# Patient Record
Sex: FEMALE | Race: WHITE | NOT HISPANIC OR LATINO | Employment: FULL TIME | URBAN - METROPOLITAN AREA
[De-identification: names, ages, dates, MRNs, and addresses within clinical notes are randomized per-mention and may not be internally consistent; named-entity substitution may affect disease eponyms.]

---

## 2019-08-01 DIAGNOSIS — Z71.84 TRAVEL ADVICE ENCOUNTER: Primary | ICD-10-CM

## 2020-04-18 ENCOUNTER — HOSPITAL ENCOUNTER (OUTPATIENT)
Dept: LAB | Age: 30
Discharge: HOME | End: 2020-04-18
Attending: INTERNAL MEDICINE

## 2020-04-18 PROCEDURE — 87635 SARS-COV-2 COVID-19 AMP PRB: CPT

## 2020-04-19 LAB
SARS-COV-2 RNA SPEC QL NAA+PROBE: NOT DETECTED
SERVICE CMNT-IMP: NORMAL
SPECIMEN SOURCE: NORMAL

## 2020-04-23 ENCOUNTER — EMPLOYEE HEALTH (OUTPATIENT)
Dept: OTHER | Age: 30
End: 2020-04-23

## 2020-04-23 DIAGNOSIS — Z20.822 SUSPECTED COVID-19 VIRUS INFECTION: Primary | ICD-10-CM

## 2020-04-24 ENCOUNTER — LAB SERVICES (OUTPATIENT)
Dept: LAB | Age: 30
End: 2020-04-24

## 2020-04-24 ENCOUNTER — EMPLOYEE HEALTH (OUTPATIENT)
Dept: OTHER | Age: 30
End: 2020-04-24

## 2020-04-24 DIAGNOSIS — Z20.822 SUSPECTED COVID-19 VIRUS INFECTION: Primary | ICD-10-CM

## 2020-04-24 DIAGNOSIS — Z20.822 SUSPECTED COVID-19 VIRUS INFECTION: ICD-10-CM

## 2020-04-24 PROCEDURE — 87635 SARS-COV-2 COVID-19 AMP PRB: CPT | Performed by: HOSPITALIST

## 2020-04-25 ENCOUNTER — EMPLOYEE HEALTH (OUTPATIENT)
Dept: OTHER | Age: 30
End: 2020-04-25

## 2020-04-25 LAB
SARS-COV-2 RNA SPEC QL NAA+PROBE: NOT DETECTED
SERVICE CMNT-IMP: NORMAL
SPECIMEN SOURCE: NORMAL

## 2020-04-30 ENCOUNTER — EMPLOYEE HEALTH (OUTPATIENT)
Dept: OTHER | Age: 30
End: 2020-04-30

## 2020-07-30 ENCOUNTER — EMPLOYEE HEALTH (OUTPATIENT)
Dept: OTHER | Age: 30
End: 2020-07-30

## 2020-12-17 ENCOUNTER — APPOINTMENT (OUTPATIENT)
Dept: LAB | Age: 30
End: 2020-12-17

## 2020-12-21 ENCOUNTER — IMMUNIZATION (OUTPATIENT)
Dept: LAB | Age: 30
End: 2020-12-21

## 2020-12-21 DIAGNOSIS — Z23 NEED FOR VACCINATION: Primary | ICD-10-CM

## 2020-12-21 PROCEDURE — 91300 COVID 19 PFIZER-BIONTECH: CPT

## 2020-12-21 PROCEDURE — 0001A COVID 19 PFIZER-BIONTECH: CPT

## 2021-01-13 ENCOUNTER — IMMUNIZATION (OUTPATIENT)
Dept: LAB | Age: 31
End: 2021-01-13

## 2021-01-13 DIAGNOSIS — Z23 NEED FOR VACCINATION: Primary | ICD-10-CM

## 2021-01-13 PROCEDURE — 91300 COVID 19 PFIZER-BIONTECH: CPT | Performed by: NURSE PRACTITIONER

## 2021-01-13 PROCEDURE — 0002A COVID 19 PFIZER-BIONTECH: CPT | Performed by: NURSE PRACTITIONER

## 2021-03-17 ENCOUNTER — WALK IN (OUTPATIENT)
Dept: URGENT CARE | Age: 31
End: 2021-03-17
Attending: FAMILY MEDICINE

## 2021-03-17 DIAGNOSIS — Z20.822 EXPOSURE TO COVID-19 VIRUS: Primary | ICD-10-CM

## 2021-03-17 PROCEDURE — C9803 HOPD COVID-19 SPEC COLLECT: HCPCS

## 2021-03-17 PROCEDURE — U0003 INFECTIOUS AGENT DETECTION BY NUCLEIC ACID (DNA OR RNA); SEVERE ACUTE RESPIRATORY SYNDROME CORONAVIRUS 2 (SARS-COV-2) (CORONAVIRUS DISEASE [COVID-19]), AMPLIFIED PROBE TECHNIQUE, MAKING USE OF HIGH THROUGHPUT TECHNOLOGIES AS DESCRIBED BY CMS-2020-01-R: HCPCS | Performed by: FAMILY MEDICINE

## 2021-03-17 PROCEDURE — 99212 OFFICE O/P EST SF 10 MIN: CPT

## 2021-03-17 ASSESSMENT — ENCOUNTER SYMPTOMS
ABDOMINAL PAIN: 0
HEADACHES: 0
SORE THROAT: 0
CHILLS: 0
NAUSEA: 0
SHORTNESS OF BREATH: 0
DIARRHEA: 0
COUGH: 0
DIZZINESS: 0
FEVER: 0
VOMITING: 0
BLURRED VISION: 0

## 2021-03-17 ASSESSMENT — PAIN SCALES - GENERAL: PAINLEVEL: 0

## 2021-03-18 LAB
SARS-COV-2 RNA RESP QL NAA+PROBE: NOT DETECTED
SERVICE CMNT-IMP: NORMAL
SERVICE CMNT-IMP: NORMAL

## 2021-05-26 VITALS
DIASTOLIC BLOOD PRESSURE: 88 MMHG | HEART RATE: 86 BPM | TEMPERATURE: 97.5 F | OXYGEN SATURATION: 98 % | RESPIRATION RATE: 16 BRPM | SYSTOLIC BLOOD PRESSURE: 116 MMHG

## 2023-07-27 ENCOUNTER — NURSE ONLY (OUTPATIENT)
Dept: INTERNAL MEDICINE CLINIC | Facility: HOSPITAL | Age: 33
End: 2023-07-27
Attending: EMERGENCY MEDICINE

## 2023-07-27 DIAGNOSIS — Z00.00 WELLNESS EXAMINATION: Primary | ICD-10-CM

## 2023-07-27 PROCEDURE — 86480 TB TEST CELL IMMUN MEASURE: CPT

## 2023-07-28 LAB
M TB IFN-G CD4+ T-CELLS BLD-ACNC: 0.93 IU/ML
M TB TUBERC IFN-G BLD QL: NEGATIVE
M TB TUBERC IGNF/MITOGEN IGNF CONTROL: >10 IU/ML
QFT TB1 AG MINUS NIL: 0.19 IU/ML
QFT TB2 AG MINUS NIL: 0.21 IU/ML

## 2024-04-10 ENCOUNTER — LAB SERVICES (OUTPATIENT)
Dept: LAB | Age: 34
End: 2024-04-10

## 2024-04-10 DIAGNOSIS — Z00.00 ROUTINE ADULT HEALTH MAINTENANCE: ICD-10-CM

## 2024-04-10 LAB
25(OH)D3+25(OH)D2 SERPL-MCNC: 24.8 NG/ML (ref 30–100)
ALBUMIN SERPL-MCNC: 4.5 G/DL (ref 3.6–5.1)
ALBUMIN/GLOB SERPL: 1.4 {RATIO} (ref 1–2.4)
ALP SERPL-CCNC: 41 UNITS/L (ref 45–117)
ALT SERPL-CCNC: 19 UNITS/L
ANION GAP SERPL CALC-SCNC: 14 MMOL/L (ref 7–19)
AST SERPL-CCNC: 20 UNITS/L
BASOPHILS # BLD: 0 K/MCL (ref 0–0.3)
BASOPHILS NFR BLD: 0 %
BILIRUB SERPL-MCNC: 1.1 MG/DL (ref 0.2–1)
BUN SERPL-MCNC: 14 MG/DL (ref 6–20)
BUN/CREAT SERPL: 17 (ref 7–25)
CALCIUM SERPL-MCNC: 9.9 MG/DL (ref 8.4–10.2)
CHLORIDE SERPL-SCNC: 101 MMOL/L (ref 97–110)
CHOLEST SERPL-MCNC: 215 MG/DL
CHOLEST/HDLC SERPL: 2.8 {RATIO}
CO2 SERPL-SCNC: 28 MMOL/L (ref 21–32)
CREAT SERPL-MCNC: 0.81 MG/DL (ref 0.51–0.95)
DEPRECATED RDW RBC: 38.6 FL (ref 39–50)
EGFRCR SERPLBLD CKD-EPI 2021: >90 ML/MIN/{1.73_M2}
EOSINOPHIL # BLD: 0.1 K/MCL (ref 0–0.5)
EOSINOPHIL NFR BLD: 3 %
ERYTHROCYTE [DISTWIDTH] IN BLOOD: 12.1 % (ref 11–15)
FASTING DURATION TIME PATIENT: 12 HOURS (ref 0–999)
GLOBULIN SER-MCNC: 3.3 G/DL (ref 2–4)
GLUCOSE SERPL-MCNC: 83 MG/DL (ref 70–99)
HCT VFR BLD CALC: 44.4 % (ref 36–46.5)
HDLC SERPL-MCNC: 76 MG/DL
HGB BLD-MCNC: 15.1 G/DL (ref 12–15.5)
IMM GRANULOCYTES # BLD AUTO: 0 K/MCL (ref 0–0.2)
IMM GRANULOCYTES # BLD: 0 %
LDLC SERPL CALC-MCNC: 127 MG/DL
LYMPHOCYTES # BLD: 2.3 K/MCL (ref 1–4.8)
LYMPHOCYTES NFR BLD: 46 %
MCH RBC QN AUTO: 30 PG (ref 26–34)
MCHC RBC AUTO-ENTMCNC: 34 G/DL (ref 32–36.5)
MCV RBC AUTO: 88.1 FL (ref 78–100)
MONOCYTES # BLD: 0.3 K/MCL (ref 0.3–0.9)
MONOCYTES NFR BLD: 6 %
NEUTROPHILS # BLD: 2.3 K/MCL (ref 1.8–7.7)
NEUTROPHILS NFR BLD: 45 %
NONHDLC SERPL-MCNC: 139 MG/DL
NRBC BLD MANUAL-RTO: 0 /100 WBC
PLATELET # BLD AUTO: 193 K/MCL (ref 140–450)
POTASSIUM SERPL-SCNC: 4.4 MMOL/L (ref 3.4–5.1)
PROT SERPL-MCNC: 7.8 G/DL (ref 6.4–8.2)
RBC # BLD: 5.04 MIL/MCL (ref 4–5.2)
SODIUM SERPL-SCNC: 139 MMOL/L (ref 135–145)
TRIGL SERPL-MCNC: 59 MG/DL
TSH SERPL-ACNC: 1.06 MCUNITS/ML (ref 0.35–5)
WBC # BLD: 5.1 K/MCL (ref 4.2–11)

## 2024-04-10 PROCEDURE — 36415 COLL VENOUS BLD VENIPUNCTURE: CPT | Performed by: CLINICAL MEDICAL LABORATORY

## 2024-04-10 PROCEDURE — 80050 GENERAL HEALTH PANEL: CPT | Performed by: CLINICAL MEDICAL LABORATORY

## 2024-04-10 PROCEDURE — 80061 LIPID PANEL: CPT | Performed by: CLINICAL MEDICAL LABORATORY

## 2024-04-10 PROCEDURE — 82306 VITAMIN D 25 HYDROXY: CPT | Performed by: CLINICAL MEDICAL LABORATORY

## 2024-09-23 ENCOUNTER — TELEPHONE (OUTPATIENT)
Dept: INTERNAL MEDICINE CLINIC | Facility: HOSPITAL | Age: 34
End: 2024-09-23

## 2024-09-23 ENCOUNTER — LAB ENCOUNTER (OUTPATIENT)
Dept: LAB | Facility: HOSPITAL | Age: 34
End: 2024-09-23
Attending: PREVENTIVE MEDICINE

## 2024-09-23 DIAGNOSIS — Z20.822 SUSPECTED COVID-19 VIRUS INFECTION: Primary | ICD-10-CM

## 2024-09-23 DIAGNOSIS — Z20.822 SUSPECTED COVID-19 VIRUS INFECTION: ICD-10-CM

## 2024-09-23 LAB — SARS-COV-2 RNA RESP QL NAA+PROBE: NOT DETECTED

## 2024-09-23 NOTE — TELEPHONE ENCOUNTER
[x] EH  []CRISTHIAN   [] [facility]  Manager : Nancy Boecker    [x] Direct Patient Care  []Indirect Patient Contact   [] Non-Clinical/No Patient Contact    For Direct Patient Care ONLY: Have you been fitted with an N95 mask within the last 12 months? [x] Yes  []No      High Risk Area:    [] Yes   [x] No    HAVE YOU RECEIVED THE COVID-19 Vaccine? Yes [x]    No []          If yes, date(s) received:   12/21/2020 01/13/2021 11/17/2022  Which vaccine:  Pfizer [x]     Moderna [x]    J&J []      SYMPTOMS (reported via dashboard):  [] asymptomatic  [x] symptomatic  [] GI symptoms only    Symptom onset date: 09/23/2024  Fever   > 100F             Yes []      Cough                          Yes []      Shortness of breath  Yes []      Congestion                 Yes []      Runny nose                Yes []        Loss of Smell              Yes []        Loss of Taste             Yes []       Sore throat                 Yes []       Fatigue                        Yes [x]       Body Aches                Yes []        Chills                           Yes []        Headache                   Yes [x]             GI symptoms             Yes []     No [x]                     Nausea   []          Vomiting            []                                    Diarrhea  []          Upset stomach []      Employee reported COVID Exposure?  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 [] Yes [x]  No    Is this a true exposure? []  Yes [x]  No    When was the last shift you worked?: 09/23/2024    Employee has a history of Covid?  Yes [x]     No []   If Yes, when: 04/2022    Employee is immunocompromised?  Yes []     No [x]      PLAN:     COVID-19 testing ordered:   [x] Rapid      [] Alinity              Date test is to be taken:   09/23/2024    []  No testing  required at this time  []  Outside testing                           Notes:    INSTRUCTIONS PROVIDED:    [x]  Employee was instructed to call Central scheduling at [phone] or use [employer] (remove any insurance information listed) to make an appointment for their testing   [x]  May return to work if employee views negative result in MyChart and remains fever, vomiting, and diarrhea free  []  May continue to work if remains asymptomatic and views negative result in MyChart  []  Follow up for condition update when resulting  []  If symptoms develop, stay home and call hotline for rapid test order  []  If COVID positive results, off work minimum of 5 days from positive test or onset of symptoms (day 0)     [x]  Plan noted above  [x]  Length of time to obtain results  []  Quarantine instructions  [x]  S/S of worsening infection/condition and importance of prompt medical re-evaluation including when to seek emergency care.   [x] The employee voiced understanding

## 2024-09-24 NOTE — TELEPHONE ENCOUNTER
Results and RTW guidelines:    COVID RESULT:    [x] Viewed by employee in [employer].  RTW plan and instructions as indicated on triage call.  Manager notified.  Estimated RTW date:   [] Discussed with employee   [x] Unable to reach by phone.  Sent via [employer] message      Test type:    [x] Rapid         [] Alinity         [] Outside test:       [x] NEGATIVE     Ordered Alinity retest?  []Yes   [x] No (skip to RTW)   Ordered Rapid retest?   []Yes   [x] No (skip to RTW)           Dated to be taken:      If Yes, PLACE ORDER NOW and instruct the following:  -Originally Symptomatic or Now Symptoms:   -RTW when sx improve- fever free for 24 hours w/o medications, Diarrhea/Vomiting for 24 hours w/o medications    -Originally  Asymptomatic  -Asymptomatic AND Vaccinated or Unvaccinated or Prior infection in past 90 days:   -May work and continue to monitor symptoms for the next 10 days.                                         -Alinity test day 2, Alinity test day 5 (day 0 - exposure)      Notes:     RTW PLAN:    []  If COVID positive results, off work minimum of 5 days from positive test or onset of symptoms (day 0)        On day 5, if asymptomatic or mildly symptomatic (with improving symptoms) may return to work day 6          On day 5, if symptomatic, call Employee Health for RTW screening        []  COVID positive result - call Employee Health on day 5 after symptom onset.  The employee needs to be cleared by Employee Health to RTW.  [x] RTW immediately, continue to monitor for sx  [] RTW when sx improve; must be fever free for 24 hours w/o medications, Diarrhea/Vomiting free for 24 hours w/o medications  [] Alinity ordered; continue to monitor sx and call for new/worsening sx.  Discuss RTW guidelines with manager  [] May continue to work  [] Follow up with PCP  [] Home until further instruction from hotline with Alinity results  INSTRUCTIONS PROVIDED:  [x]  Plan as noted above  []  Length of time to obtain results   []   Quarantine instructions  []  Masking protocol   []  S/S of worsening infection/condition and importance of prompt medical re-evaluation including when to seek emergency care  [] If symptoms develop, stay home and call hotline for rapid test order    Estimated RTW date:      [] The employee voiced understanding of above plan/instructions  [x] Manager Notified

## 2025-03-12 ENCOUNTER — OFFICE VISIT (OUTPATIENT)
Dept: OBGYN CLINIC | Facility: CLINIC | Age: 35
End: 2025-03-12

## 2025-03-12 VITALS
SYSTOLIC BLOOD PRESSURE: 120 MMHG | WEIGHT: 90 LBS | BODY MASS INDEX: 14.12 KG/M2 | HEIGHT: 67 IN | DIASTOLIC BLOOD PRESSURE: 90 MMHG

## 2025-03-12 DIAGNOSIS — N92.6 MENSTRUAL CHANGES: ICD-10-CM

## 2025-03-12 DIAGNOSIS — N97.9 FEMALE INFERTILITY: ICD-10-CM

## 2025-03-12 DIAGNOSIS — Z31.69 ENCOUNTER FOR PRECONCEPTION CONSULTATION: Primary | ICD-10-CM

## 2025-03-12 PROCEDURE — 99203 OFFICE O/P NEW LOW 30 MIN: CPT | Performed by: STUDENT IN AN ORGANIZED HEALTH CARE EDUCATION/TRAINING PROGRAM

## 2025-03-12 NOTE — PROGRESS NOTES
[facility]  Obstetrics and Gynecology  Gynecology New Patient  Exam    Chief Complaint   Patient presents with    Gyn Exam     Family planning ,            Raysa Manzano is a 35 year old female presenting as a new patient for preconception counseling and infertility; started trying to conceive in Jan 2024 with timed intercourse on an corona, and then started with OPK + timed intercourse for the last 9 months without pregnancy.    Taking PNV and Vit D. Trying without OPK since Jan 2024.     Skipped 1 period 3 mos ago due to studying for [employer] (endocrine, works at [employer]).  works for [employer].     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   G0     has no conditions, semen analysis pending with urologist. Beta thal minor. Has never fathered a child.     Timed intercourse for the last 9 months with OPKs (sometimes skips if stressed). Ovulating day 16-17.   Last ovulated 3/2. [place] daily to every other day.       Menstrual/Gyn Hx:   Menarche 18  Flow is/was regular, every 32-33 days and lasting 5-6 days. Moderate flow overall.  Has  dysmenorrhea (first 2 days - responds to ibuprofen). Does not precede bleeding or associated with   Reports no h/o abnormal Pap  No h/o STI  Reports no h/o fibroids or ovarian cysts. Has never had any type of GYN surgery.   Has never used birth control in the past;.   Is  currently sexually active, 1 lifetime partners, and reports no problems with intercourse.   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         Medications (Active prior to today's visit):  No current outpatient medications on file.      Allergies:  Allergies[1]  HISTORY:     OB History    Para Term  AB Living   0 0 0 0 0 0   SAB IAB Ectopic Multiple Live Births   0 0 0 0 0       Past Medical History:    Decorative tattoo       Past Surgical History:   Procedure Laterality Date    Other surgical history  TE repair       Family History   Problem Relation Age of Onset    High Cholesterol Father     High Cholesterol Mother     Hypertension Mother     Diabetes Maternal Grandmother     Breast Cancer Maternal Grandmother     Cancer Paternal Grandmother         Bladder Cancer; diagnosed around age 80    Heart Disease Paternal Grandfather        Social History     Socioeconomic History    Marital status:      Spouse name: Not on file    Number of children: Not on file    Years of education: Not on file    Highest education level: Not on file   Occupational History    Not on file   Tobacco Use    Smoking status: Never     Passive exposure: Never    Smokeless tobacco: Never   Vaping Use    Vaping status: Never Used   Substance and Sexual Activity    Alcohol use: Yes    Drug use: Never    Sexual activity: Yes     Partners: Male   Other Topics Concern    Not on file   Social History Narrative    Not on file     Social Drivers of Health     Food Insecurity: Not on file   Transportation Needs: Unknown (2024)    Received from [facility]    Transportation Needs     Currently or in the past 3 months, has lack of transportation kept you from medical appointments, getting food or medicine, or providing care to a family member?: Unrecognized value     : Not on file     Medical Transportation Needs?: Did Not Ask     Daily Living Transportation Needs? [Peds Only] : Not on file   Stress: Not on file   Housing Stability: Low Risk  (2022)    Received from [facility], [facility]    Housing Stability     Mortgage Payment Concerns?: Not on file     Number of Places Lived in the Last  Year: Not on file     Unstable Housing?: Not on file       ROS:   Review of Systems:    General: no fevers, chills, unintended weight loss/gain except as above  Cardiovascular: no chest pain, new or unexplained SOB except as above  Gastrointestinal: no nausea/vomiting, diarrhea, or blood in stool except as above  Respiratory: no new symptoms reported except as above  Skin: no new symptoms reported except as above  Psychiatric: no new symptoms reported except as above  PHYSICAL EXAM:   /90   Ht 5' 7\" (1.702 m)   Wt 90 lb (40.8 kg)   LMP 02/14/2025 (Exact Date)   BMI 14.10 kg/m²     Physical Exam  HENT:      Head: Normocephalic and atraumatic.   Eyes:      Extraocular Movements: Extraocular movements intact.   Pulmonary:      Effort: Pulmonary effort is normal.   Abdominal:      Palpations: Abdomen is soft.   Genitourinary:     Comments: Deferred  Musculoskeletal:      Cervical back: Normal range of motion.   Skin:     General: Skin is warm and dry.   Neurological:      General: No focal deficit present.      Mental Status: She is alert. Mental status is at baseline.   Psychiatric:         Behavior: Behavior normal.         Thought Content: Thought content normal.             RESULTS & IMAGING         No results for input(s): \"URINEPREG\" in the last 72 hours.    No results for input(s): \"PGLU\", \"POCTGLUCOSE\" in the last 72 hours.    No results for input(s): \"GLUCOSEDIP\", \"BILIRUBIN\", \"KETONESDIP\", \"BLOODU\", \"PHURINE\", \"UROBILIN\", \"NITRITE\", \"LEUKOCYTES\", \"APPEARANCE\", \"URINECOLOR\" in the last 72 hours.    Invalid input(s): \"SPECGRAV\"       ASSESSMENT & PLAN     Encounter for preconception consultation (Primary)  -     Type and screen  -     Protein/Creatinine Ratio, Urine Random; Future; Expected date: 03/17/2025  -     Cystic Fibrosis (CF) Full-gene Carrier Screen; Future; Expected date: 03/17/2025  -     Spinal Muscular Atrophy (SMA); Future; Expected date: 03/17/2025  -     Hemoglobin Electrophoresis  w/Rflx Alpha,beta Chain; Future; Expected date: 03/17/2025  -     FSH; Future; Expected date: 03/17/2025  -     LH (Luteinizing Hormone); Future; Expected date: 03/17/2025  -     Testosterone,Total and Weakly Bound w/ SHBG; Future; Expected date: 03/17/2025  -     Androstenedione; Future; Expected date: 03/17/2025  -     US PELVIS (TRANSABDOMINAL PELVIS)  (CPT=76856); Future; Expected date: 03/17/2025  -     Anti-Müllerian Hormone (AMH) ([facility])  -     Assay, Thyroid Stim Hormone  -     CBC, Platelet; No Differential  -     Comp Metabolic Panel (14)  -     Dehydroepiandrosterone Sulfate  -     Free T4, (Free Thyroxine)  -     Hemoglobin A1C  -     Hepatitis B Surface Antigen  -     HIV Ag/Ab Combo  -     Rubella Antibodies, IgG  -     T Pallidum Screening Cascade  -     Thyroid Peroxidase (TPO) AB  -     Varicella Zoster, IGG  Female infertility  -     FSH; Future; Expected date: 03/17/2025  -     LH (Luteinizing Hormone); Future; Expected date: 03/17/2025  -     Testosterone,Total and Weakly Bound w/ SHBG; Future; Expected date: 03/17/2025  -     Androstenedione; Future; Expected date: 03/17/2025  -     XR HYSTEROSALPINGOGRAM (CPT=74740/17236); Future; Expected date: 03/17/2025  -     US PELVIS (TRANSABDOMINAL PELVIS)  (CPT=76856); Future; Expected date: 03/17/2025  -     Anti-Müllerian Hormone (AMH) ([facility])  -     Assay, Thyroid Stim Hormone  -     Dehydroepiandrosterone Sulfate  -     Free T4, (Free Thyroxine)  -     Thyroid Peroxidase (TPO) AB  Menstrual changes  -     Testosterone,Total and Weakly Bound w/ SHBG; Future; Expected date: 03/17/2025  -     Androstenedione; Future; Expected date: 03/17/2025  -     US PELVIS (TRANSABDOMINAL PELVIS)  (CPT=76856); Future; Expected date: 03/17/2025  -     Assay, Thyroid Stim Hormone  -     Dehydroepiandrosterone Sulfate  -     Free T4, (Free Thyroxine)  -     Thyroid Peroxidase (TPO) AB      Discussed infertility evaluation + optimizing timed  intercourse  Preconception labs ordered as well   F/u after labs resulted (will get on day 3 of next cycle)    Louisa Nunn MD  3/12/2025  10:36 AM             [1] No Known Allergies

## 2025-03-21 ENCOUNTER — TELEPHONE (OUTPATIENT)
Dept: OBGYN CLINIC | Facility: CLINIC | Age: 35
End: 2025-03-21

## 2025-03-22 ENCOUNTER — LAB ENCOUNTER (OUTPATIENT)
Dept: LAB | Facility: HOSPITAL | Age: 35
End: 2025-03-22
Attending: STUDENT IN AN ORGANIZED HEALTH CARE EDUCATION/TRAINING PROGRAM
Payer: COMMERCIAL

## 2025-03-22 DIAGNOSIS — N92.6 MENSTRUAL CHANGES: ICD-10-CM

## 2025-03-22 DIAGNOSIS — Z31.69 ENCOUNTER FOR PRECONCEPTION CONSULTATION: ICD-10-CM

## 2025-03-22 DIAGNOSIS — N97.9 FEMALE INFERTILITY: ICD-10-CM

## 2025-03-22 LAB
ALBUMIN SERPL-MCNC: 5.1 G/DL (ref 3.2–4.8)
ALBUMIN/GLOB SERPL: 1.8 {RATIO} (ref 1–2)
ALP LIVER SERPL-CCNC: 45 U/L
ALT SERPL-CCNC: 13 U/L
ANION GAP SERPL CALC-SCNC: 9 MMOL/L (ref 0–18)
ANTIBODY SCREEN: NEGATIVE
AST SERPL-CCNC: 20 U/L (ref ?–34)
BILIRUB SERPL-MCNC: 0.9 MG/DL (ref 0.3–1.2)
BUN BLD-MCNC: 7 MG/DL (ref 9–23)
CALCIUM BLD-MCNC: 10.4 MG/DL (ref 8.7–10.6)
CHLORIDE SERPL-SCNC: 104 MMOL/L (ref 98–112)
CO2 SERPL-SCNC: 28 MMOL/L (ref 21–32)
CREAT BLD-MCNC: 0.92 MG/DL
DHEA-S SERPL-MCNC: 226 UG/DL
EGFRCR SERPLBLD CKD-EPI 2021: 84 ML/MIN/1.73M2 (ref 60–?)
ERYTHROCYTE [DISTWIDTH] IN BLOOD BY AUTOMATED COUNT: 11.8 %
EST. AVERAGE GLUCOSE BLD GHB EST-MCNC: 108 MG/DL (ref 68–126)
FASTING STATUS PATIENT QL REPORTED: YES
FSH SERPL-ACNC: 7 MIU/ML
GLOBULIN PLAS-MCNC: 2.8 G/DL (ref 2–3.5)
GLUCOSE BLD-MCNC: 88 MG/DL (ref 70–99)
HBA1C MFR BLD: 5.4 % (ref ?–5.7)
HBV SURFACE AG SER-ACNC: 0.29 [IU]/L
HBV SURFACE AG SERPL QL IA: NONREACTIVE
HCT VFR BLD AUTO: 39.4 %
HGB BLD-MCNC: 13.6 G/DL
LH SERPL-ACNC: 9.7 MIU/ML
MCH RBC QN AUTO: 30.2 PG (ref 26–34)
MCHC RBC AUTO-ENTMCNC: 34.5 G/DL (ref 31–37)
MCV RBC AUTO: 87.4 FL
OSMOLALITY SERPL CALC.SUM OF ELEC: 289 MOSM/KG (ref 275–295)
PLATELET # BLD AUTO: 210 10(3)UL (ref 150–450)
POTASSIUM SERPL-SCNC: 3.9 MMOL/L (ref 3.5–5.1)
PROT SERPL-MCNC: 7.9 G/DL (ref 5.7–8.2)
RBC # BLD AUTO: 4.51 X10(6)UL
RH BLOOD TYPE: POSITIVE
RUBV IGG SER QL: POSITIVE
RUBV IGG SER-ACNC: 16 IU/ML (ref 10–?)
SODIUM SERPL-SCNC: 141 MMOL/L (ref 136–145)
T PALLIDUM AB SER QL IA: NONREACTIVE
T4 FREE SERPL-MCNC: 1.4 NG/DL (ref 0.8–1.7)
THYROPEROXIDASE AB SERPL-ACNC: <28 U/ML (ref ?–60)
TSI SER-ACNC: 1.44 UIU/ML (ref 0.55–4.78)
WBC # BLD AUTO: 4.4 X10(3) UL (ref 4–11)

## 2025-03-22 PROCEDURE — 86901 BLOOD TYPING SEROLOGIC RH(D): CPT | Performed by: STUDENT IN AN ORGANIZED HEALTH CARE EDUCATION/TRAINING PROGRAM

## 2025-03-22 PROCEDURE — 86900 BLOOD TYPING SEROLOGIC ABO: CPT | Performed by: STUDENT IN AN ORGANIZED HEALTH CARE EDUCATION/TRAINING PROGRAM

## 2025-03-22 PROCEDURE — 83036 HEMOGLOBIN GLYCOSYLATED A1C: CPT | Performed by: STUDENT IN AN ORGANIZED HEALTH CARE EDUCATION/TRAINING PROGRAM

## 2025-03-22 PROCEDURE — 84439 ASSAY OF FREE THYROXINE: CPT | Performed by: STUDENT IN AN ORGANIZED HEALTH CARE EDUCATION/TRAINING PROGRAM

## 2025-03-22 PROCEDURE — 83002 ASSAY OF GONADOTROPIN (LH): CPT

## 2025-03-22 PROCEDURE — 86787 VARICELLA-ZOSTER ANTIBODY: CPT | Performed by: STUDENT IN AN ORGANIZED HEALTH CARE EDUCATION/TRAINING PROGRAM

## 2025-03-22 PROCEDURE — 83020 HEMOGLOBIN ELECTROPHORESIS: CPT

## 2025-03-22 PROCEDURE — 86850 RBC ANTIBODY SCREEN: CPT | Performed by: STUDENT IN AN ORGANIZED HEALTH CARE EDUCATION/TRAINING PROGRAM

## 2025-03-22 PROCEDURE — 86780 TREPONEMA PALLIDUM: CPT | Performed by: STUDENT IN AN ORGANIZED HEALTH CARE EDUCATION/TRAINING PROGRAM

## 2025-03-22 PROCEDURE — 87389 HIV-1 AG W/HIV-1&-2 AB AG IA: CPT | Performed by: STUDENT IN AN ORGANIZED HEALTH CARE EDUCATION/TRAINING PROGRAM

## 2025-03-22 PROCEDURE — 83520 IMMUNOASSAY QUANT NOS NONAB: CPT | Performed by: STUDENT IN AN ORGANIZED HEALTH CARE EDUCATION/TRAINING PROGRAM

## 2025-03-22 PROCEDURE — 80053 COMPREHEN METABOLIC PANEL: CPT | Performed by: STUDENT IN AN ORGANIZED HEALTH CARE EDUCATION/TRAINING PROGRAM

## 2025-03-22 PROCEDURE — 36415 COLL VENOUS BLD VENIPUNCTURE: CPT

## 2025-03-22 PROCEDURE — 83021 HEMOGLOBIN CHROMOTOGRAPHY: CPT

## 2025-03-22 PROCEDURE — 84443 ASSAY THYROID STIM HORMONE: CPT | Performed by: STUDENT IN AN ORGANIZED HEALTH CARE EDUCATION/TRAINING PROGRAM

## 2025-03-22 PROCEDURE — 86762 RUBELLA ANTIBODY: CPT | Performed by: STUDENT IN AN ORGANIZED HEALTH CARE EDUCATION/TRAINING PROGRAM

## 2025-03-22 PROCEDURE — 83001 ASSAY OF GONADOTROPIN (FSH): CPT

## 2025-03-22 PROCEDURE — 87340 HEPATITIS B SURFACE AG IA: CPT | Performed by: STUDENT IN AN ORGANIZED HEALTH CARE EDUCATION/TRAINING PROGRAM

## 2025-03-22 PROCEDURE — 81329 SMN1 GENE DOS/DELETION ALYS: CPT

## 2025-03-22 PROCEDURE — 84410 TESTOSTERONE BIOAVAILABLE: CPT

## 2025-03-22 PROCEDURE — 85027 COMPLETE CBC AUTOMATED: CPT | Performed by: STUDENT IN AN ORGANIZED HEALTH CARE EDUCATION/TRAINING PROGRAM

## 2025-03-22 PROCEDURE — 86376 MICROSOMAL ANTIBODY EACH: CPT | Performed by: STUDENT IN AN ORGANIZED HEALTH CARE EDUCATION/TRAINING PROGRAM

## 2025-03-22 PROCEDURE — 82627 DEHYDROEPIANDROSTERONE: CPT | Performed by: STUDENT IN AN ORGANIZED HEALTH CARE EDUCATION/TRAINING PROGRAM

## 2025-03-22 PROCEDURE — 82157 ASSAY OF ANDROSTENEDIONE: CPT

## 2025-03-24 LAB — VZV IGG SER IA-ACNC: 5.75 (ref 1–?)

## 2025-03-26 LAB
HGB A2 MFR BLD: 2.5 % (ref 1.5–3.5)
HGB PNL BLD ELPH: 97.5 % (ref 95.5–100)
MULLERIAN AMH: 2.91 NG/ML

## 2025-03-28 LAB
ANDROSTENEDIONE: 155 NG/DL
SEX HORM BIND GLOB: 125 NMOL/L
TESTOST % FREE+WEAK BND: 6.3 %
TESTOST FREE+WEAK BND: 1.9 NG/DL
TESTOSTERONE TOT /MS: 30.6 NG/DL

## 2025-04-23 ENCOUNTER — HOSPITAL ENCOUNTER (OUTPATIENT)
Dept: ULTRASOUND IMAGING | Age: 35
Discharge: HOME | End: 2025-04-23
Attending: STUDENT IN AN ORGANIZED HEALTH CARE EDUCATION/TRAINING PROGRAM
Payer: COMMERCIAL

## 2025-04-23 DIAGNOSIS — N97.9 FEMALE INFERTILITY: ICD-10-CM

## 2025-04-23 DIAGNOSIS — N92.6 MENSTRUAL CHANGES: ICD-10-CM

## 2025-04-23 DIAGNOSIS — Z31.69 ENCOUNTER FOR PRECONCEPTION CONSULTATION: ICD-10-CM

## 2025-04-23 PROCEDURE — 76856 US EXAM PELVIC COMPLETE: CPT | Performed by: STUDENT IN AN ORGANIZED HEALTH CARE EDUCATION/TRAINING PROGRAM

## 2025-04-23 PROCEDURE — 76830 TRANSVAGINAL US NON-OB: CPT | Performed by: STUDENT IN AN ORGANIZED HEALTH CARE EDUCATION/TRAINING PROGRAM

## 2025-06-10 ENCOUNTER — LAB ENCOUNTER (OUTPATIENT)
Dept: LAB | Facility: HOSPITAL | Age: 35
End: 2025-06-10
Attending: PREVENTIVE MEDICINE
Payer: COMMERCIAL

## 2025-06-10 DIAGNOSIS — Z20.822 SUSPECTED COVID-19 VIRUS INFECTION: ICD-10-CM

## 2025-06-10 LAB — SARS-COV-2 RNA RESP QL NAA+PROBE: NOT DETECTED

## 2025-07-20 ENCOUNTER — LAB ENCOUNTER (OUTPATIENT)
Dept: LAB | Facility: HOSPITAL | Age: 35
End: 2025-07-20
Attending: STUDENT IN AN ORGANIZED HEALTH CARE EDUCATION/TRAINING PROGRAM
Payer: COMMERCIAL

## 2025-07-20 DIAGNOSIS — O20.9 BLEEDING IN EARLY PREGNANCY (HCC): ICD-10-CM

## 2025-07-20 LAB
B-HCG SERPL-ACNC: 55.2 MIU/ML (ref ?–4.2)
PROGEST SERPL-MCNC: 0.88 NG/ML

## 2025-07-20 PROCEDURE — 36415 COLL VENOUS BLD VENIPUNCTURE: CPT

## 2025-07-20 PROCEDURE — 84144 ASSAY OF PROGESTERONE: CPT

## 2025-07-20 PROCEDURE — 84702 CHORIONIC GONADOTROPIN TEST: CPT

## 2025-07-22 ENCOUNTER — LAB ENCOUNTER (OUTPATIENT)
Dept: LAB | Facility: HOSPITAL | Age: 35
End: 2025-07-22
Attending: STUDENT IN AN ORGANIZED HEALTH CARE EDUCATION/TRAINING PROGRAM
Payer: COMMERCIAL

## 2025-07-22 DIAGNOSIS — O20.9 BLEEDING IN EARLY PREGNANCY (HCC): ICD-10-CM

## 2025-07-22 LAB — B-HCG SERPL-ACNC: 25.6 MIU/ML (ref ?–4.2)

## 2025-07-22 PROCEDURE — 36415 COLL VENOUS BLD VENIPUNCTURE: CPT

## 2025-07-22 PROCEDURE — 84702 CHORIONIC GONADOTROPIN TEST: CPT

## 2025-07-23 ENCOUNTER — OFFICE VISIT (OUTPATIENT)
Dept: OBGYN CLINIC | Facility: CLINIC | Age: 35
End: 2025-07-23
Payer: COMMERCIAL

## 2025-07-23 VITALS
BODY MASS INDEX: 14 KG/M2 | WEIGHT: 90 LBS | HEART RATE: 121 BPM | DIASTOLIC BLOOD PRESSURE: 67 MMHG | SYSTOLIC BLOOD PRESSURE: 96 MMHG

## 2025-07-23 DIAGNOSIS — N93.9 VAGINAL BLEEDING: ICD-10-CM

## 2025-07-23 DIAGNOSIS — Z32.01 PREGNANCY TEST POSITIVE (HCC): Primary | ICD-10-CM

## 2025-08-01 ENCOUNTER — LAB ENCOUNTER (OUTPATIENT)
Dept: LAB | Facility: HOSPITAL | Age: 35
End: 2025-08-01
Attending: STUDENT IN AN ORGANIZED HEALTH CARE EDUCATION/TRAINING PROGRAM

## 2025-08-01 LAB — B-HCG SERPL-ACNC: 2.7 MIU/ML (ref ?–4.2)

## 2025-08-01 PROCEDURE — 36415 COLL VENOUS BLD VENIPUNCTURE: CPT | Performed by: OBSTETRICS & GYNECOLOGY

## 2025-08-01 PROCEDURE — 84702 CHORIONIC GONADOTROPIN TEST: CPT | Performed by: OBSTETRICS & GYNECOLOGY
